# Patient Record
Sex: FEMALE | Race: BLACK OR AFRICAN AMERICAN | NOT HISPANIC OR LATINO | Employment: PART TIME | URBAN - METROPOLITAN AREA
[De-identification: names, ages, dates, MRNs, and addresses within clinical notes are randomized per-mention and may not be internally consistent; named-entity substitution may affect disease eponyms.]

---
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
## 2019-07-28 ENCOUNTER — APPOINTMENT (EMERGENCY)
Dept: RADIOLOGY | Facility: HOSPITAL | Age: 19
End: 2019-07-28
Payer: COMMERCIAL

## 2019-07-28 ENCOUNTER — HOSPITAL ENCOUNTER (EMERGENCY)
Facility: HOSPITAL | Age: 19
Discharge: HOME | End: 2019-07-28
Attending: EMERGENCY MEDICINE | Admitting: EMERGENCY MEDICINE
Payer: COMMERCIAL

## 2019-07-28 VITALS
TEMPERATURE: 98.7 F | SYSTOLIC BLOOD PRESSURE: 115 MMHG | HEART RATE: 74 BPM | OXYGEN SATURATION: 99 % | BODY MASS INDEX: 18.4 KG/M2 | WEIGHT: 114 LBS | RESPIRATION RATE: 16 BRPM | DIASTOLIC BLOOD PRESSURE: 90 MMHG

## 2019-07-28 DIAGNOSIS — S63.619A FINGER SPRAIN: Primary | ICD-10-CM

## 2019-07-28 PROCEDURE — 99283 EMERGENCY DEPT VISIT LOW MDM: CPT

## 2019-07-28 PROCEDURE — 73130 X-RAY EXAM OF HAND: CPT

## 2019-07-28 PROCEDURE — 99283 EMERGENCY DEPT VISIT LOW MDM: CPT | Performed by: EMERGENCY MEDICINE

## 2019-07-28 RX ORDER — IBUPROFEN 600 MG/1
600 TABLET ORAL EVERY 6 HOURS PRN
Qty: 30 TABLET | Refills: 0 | Status: SHIPPED | OUTPATIENT
Start: 2019-07-28 | End: 2019-12-17 | Stop reason: ALTCHOICE

## 2019-07-28 NOTE — ED PROCEDURE NOTE
Procedure  Splint application  Date/Time: 7/28/2019 12:58 PM  Performed by: Raven Alonso PA-C  Authorized by: Raven Alonso PA-C     Patient location:  ED  Procedure performed by emergency physician: No    Other Assisting Provider: No    Consent:     Consent obtained:  Verbal    Consent given by:  Patient    Risks discussed:  Discoloration, numbness, pain and swelling    Alternatives discussed:  No treatment  Universal protocol:     Procedure explained and questions answered to patient or proxy's satisfaction: yes      Relevant documents present and verified: yes      Test results available and properly labeled: yes      Radiology Images displayed and confirmed  If images not available, report reviewed: yes      Required blood products, implants, devices, and special equipment available: yes      Site/side marked: yes      Immediately prior to procedure a time out was called: yes      Patient identity confirmed:  Verbally with patient and arm band  Indication:     Indications: sprain/strain    Pre-procedure details:     Sensation:  Normal  Procedure details:     Laterality:  Right    Location:  Finger    Finger:  R small finger    Strapping: no      Splint type:  Finger splint, static    Supplies:  Aluminum splint  Post-procedure details:     Pain:  Improved    Sensation:  Normal    Neurovascular Exam: skin pink, capillary refill <2 sec, normal pulses and skin intact, warm, and dry      Patient tolerance of procedure:   Tolerated well, no immediate complications                     Raven Alonso PA-C  07/28/19 1258

## 2019-07-28 NOTE — ED PROVIDER NOTES
History  Chief Complaint   Patient presents with    Finger Injury     Hit right pinky finger  while playing basketball  No meds taken  History provided by:  Patient   used: No    Medical Problem   Location:  Pt with right hand/5th finger pain  from basketball hitting it  Severity:  Mild  Onset quality:  Sudden  Duration:  2 days  Timing:  Constant  Progression:  Unchanged  Chronicity:  New  Associated symptoms: no abdominal pain, no chest pain, no congestion, no cough, no diarrhea, no ear pain, no fatigue, no fever, no headaches, no loss of consciousness, no myalgias, no nausea, no rash, no rhinorrhea, no shortness of breath, no sore throat, no vomiting and no wheezing        None       History reviewed  No pertinent past medical history  History reviewed  No pertinent surgical history  History reviewed  No pertinent family history  I have reviewed and agree with the history as documented  Social History     Tobacco Use    Smoking status: Never Smoker    Smokeless tobacco: Never Used   Substance Use Topics    Alcohol use: No    Drug use: No        Review of Systems   Constitutional: Negative  Negative for fatigue and fever  HENT: Negative  Negative for congestion, ear pain, rhinorrhea and sore throat  Eyes: Negative  Respiratory: Negative  Negative for cough, shortness of breath and wheezing  Cardiovascular: Negative  Negative for chest pain  Gastrointestinal: Negative  Negative for abdominal pain, diarrhea, nausea and vomiting  Endocrine: Negative  Genitourinary: Negative  Musculoskeletal: Negative  Negative for myalgias  Skin: Negative  Negative for rash  Allergic/Immunologic: Negative  Neurological: Negative  Negative for loss of consciousness and headaches  Hematological: Negative  Psychiatric/Behavioral: Negative  All other systems reviewed and are negative        Physical Exam  Physical Exam   Constitutional: She appears well-developed and well-nourished  HENT:   Head: Normocephalic and atraumatic  Right Ear: External ear normal    Left Ear: External ear normal    Nose: Nose normal    Mouth/Throat: Oropharynx is clear and moist    Eyes: Pupils are equal, round, and reactive to light  Conjunctivae and EOM are normal    Neck: Normal range of motion  Neck supple  Cardiovascular: Normal rate, regular rhythm, normal heart sounds and intact distal pulses  Pulmonary/Chest: Effort normal and breath sounds normal    Abdominal: Soft  Bowel sounds are normal    Musculoskeletal: Normal range of motion  Right 5th finger mcp and pip joint pain  From with pain   sensatin wnl of tip    Neurological: She is alert  Skin: Skin is warm  Capillary refill takes less than 2 seconds  Psychiatric: She has a normal mood and affect  Her behavior is normal    Nursing note and vitals reviewed  Vital Signs  ED Triage Vitals [07/28/19 1127]   Temperature Pulse Respirations Blood Pressure SpO2   98 7 °F (37 1 °C) 75 16 112/95 99 %      Temp Source Heart Rate Source Patient Position - Orthostatic VS BP Location FiO2 (%)   Tympanic Monitor Lying Left arm --      Pain Score       8           Vitals:    07/28/19 1127 07/28/19 1313   BP: 112/95 115/90   Pulse: 75 74   Patient Position - Orthostatic VS: Lying Sitting         Visual Acuity      ED Medications  Medications - No data to display    Diagnostic Studies  Results Reviewed     None                 XR hand 3+ views RIGHT   Final Result by Marisabel Emmanuel DO (07/28 1322)      No acute osseous abnormality  Workstation performed: [id number]                    Procedures  Procedures       ED Course                               MDM    Disposition  Final diagnoses:   Finger sprain     Time reflects when diagnosis was documented in both MDM as applicable and the Disposition within this note     Time User Action Codes Description Comment    7/28/2019 12:59 PM Vaishnavi Holloway   Add [F67 025V] Finger sprain       ED Disposition     ED Disposition Condition Date/Time Comment    Discharge Stable Sun Jul 28, 2019 12:59 PM Joey Gordillo discharge to home/self care  Follow-up Information     Follow up With Specialties Details Why [address] Devora Cee MD [address] [facility] [address] [place] [address]            Discharge Medication List as of 7/28/2019 12:59 PM      START taking these medications    Details   ibuprofen (MOTRIN) 600 mg tablet Take 1 tablet (600 mg total) by mouth every 6 (six) hours as needed for mild pain, Starting Sun 7/28/2019, Print           No discharge procedures on file      ED Provider  Electronically Signed by           Adam Benavides PA-C  07/28/19 0961

## 2019-10-25 ENCOUNTER — OFFICE VISIT (OUTPATIENT)
Dept: FAMILY MEDICINE CLINIC | Facility: CLINIC | Age: 19
End: 2019-10-25

## 2019-10-25 VITALS
OXYGEN SATURATION: 98 % | RESPIRATION RATE: 18 BRPM | HEIGHT: 67 IN | WEIGHT: 115.7 LBS | HEART RATE: 88 BPM | SYSTOLIC BLOOD PRESSURE: 124 MMHG | TEMPERATURE: 97.6 F | DIASTOLIC BLOOD PRESSURE: 80 MMHG | BODY MASS INDEX: 18.16 KG/M2

## 2019-10-25 DIAGNOSIS — Z00.00 ANNUAL PHYSICAL EXAM: Primary | ICD-10-CM

## 2019-10-25 DIAGNOSIS — Z13.220 SCREENING CHOLESTEROL LEVEL: ICD-10-CM

## 2019-10-25 DIAGNOSIS — Z13.1 SCREENING FOR DIABETES MELLITUS: ICD-10-CM

## 2019-10-25 PROCEDURE — 99385 PREV VISIT NEW AGE 18-39: CPT | Performed by: PHYSICIAN ASSISTANT

## 2019-10-25 NOTE — PATIENT INSTRUCTIONS

## 2019-10-25 NOTE — PROGRESS NOTES
ADULT ANNUAL [facility] [address] PRACTICE [place]    NAME: Maria R Mackenzie  AGE: 20 y o  SEX: female  : 2000     DATE: 10/25/2019     Assessment and Plan:     Problem List Items Addressed This Visit     None      Visit Diagnoses     Annual physical exam    -  Primary    Screening for diabetes mellitus        Relevant Orders    Comprehensive metabolic panel    Screening cholesterol level        Relevant Orders    Lipid panel          Immunizations and preventive care screenings were discussed with patient today  Appropriate education was printed on patient's after visit summary  Counseling:  Alcohol/drug use: discussed moderation in alcohol intake, the recommendations for healthy alcohol use, and avoidance of illicit drug use  Dental Health: discussed importance of regular tooth brushing, flossing, and dental visits  Injury prevention: discussed safety/seat belts, safety helmets, smoke detectors, carbon dioxide detectors, and smoking near bedding or upholstery  Sexual health: discussed sexually transmitted diseases, partner selection, use of condoms, avoidance of unintended pregnancy, and contraceptive alternatives  · Exercise: the importance of regular exercise/physical activity was discussed  Recommend exercise 3-5 times per week for at least 30 minutes  No follow-ups on file  Chief Complaint:     Chief Complaint   Patient presents with   [address]     no concerns at this time, has drivers form to be filled out       History of Present Illness:     Adult Annual Physical   Patient here for a comprehensive physical exam  The patient reports no problems  Diet and Physical Activity  · Diet/Nutrition: well balanced diet, limited junk food and consuming 3-5 servings of fruits/vegetables daily  · Exercise: no formal exercise        Depression Screening  PHQ-9 Depression Screening    PHQ-9:    Frequency of the following problems over the past two weeks:       Little interest or pleasure in doing things:  0 - not at all  Feeling down, depressed, or hopeless:  0 - not at all  PHQ-2 Score:  0       General Health  · Sleep: sleeps well and gets 7-8 hours of sleep on average  · Hearing: normal - bilateral   · Vision: no vision problems  · Dental: regular dental visits and brushes teeth twice daily  /GYN Health  · Last menstrual period: 9/30/2019  · Contraceptive method: barrier methods  · History of STDs?: no      Review of Systems:     Review of Systems   Constitutional: Negative for activity change, appetite change, chills, diaphoresis, fatigue and fever  HENT: Negative for congestion, ear pain, hearing loss, rhinorrhea and sore throat  Eyes: Negative for pain and visual disturbance  Respiratory: Negative for cough, shortness of breath and wheezing  Cardiovascular: Negative for chest pain, palpitations and leg swelling  Gastrointestinal: Negative for abdominal pain, blood in stool, constipation, diarrhea, nausea and vomiting  Endocrine: Negative for cold intolerance, heat intolerance and polyuria  Genitourinary: Negative for difficulty urinating, dysuria, frequency, hematuria and urgency  Musculoskeletal: Negative for arthralgias, joint swelling and myalgias  Skin: Negative for rash and wound  Neurological: Negative for dizziness, seizures, syncope, weakness, numbness and headaches  Psychiatric/Behavioral: Negative for dysphoric mood and sleep disturbance  The patient is not nervous/anxious  Past Medical History:     History reviewed  No pertinent past medical history  Past Surgical History:     History reviewed  No pertinent surgical history     Social History:     Social History     Socioeconomic History    Marital status: Single     Spouse name: None    Number of children: None    Years of education: None    Highest education level: None   Occupational History    None   Social Needs    Financial resource strain: None    Food insecurity:     Worry: None     Inability: None    Transportation needs:     Medical: None     Non-medical: None   Tobacco Use    Smoking status: Never Smoker    Smokeless tobacco: Never Used   Substance and Sexual Activity    Alcohol use: No    Drug use: No    Sexual activity: None   Lifestyle    Physical activity:     Days per week: None     Minutes per session: None    Stress: None   Relationships    Social connections:     Talks on phone: None     Gets together: None     Attends Orthodoxy service: None     Active member of club or organization: None     Attends meetings of clubs or organizations: None     Relationship status: None    Intimate partner violence:     Fear of current or ex partner: None     Emotionally abused: None     Physically abused: None     Forced sexual activity: None   Other Topics Concern    None   Social History Narrative    None      Family History:     History reviewed  No pertinent family history  Current Medications:     Current Outpatient Medications   Medication Sig Dispense Refill    ibuprofen (MOTRIN) 600 mg tablet Take 1 tablet (600 mg total) by mouth every 6 (six) hours as needed for mild pain 30 tablet 0     No current facility-administered medications for this visit  Allergies: Allergies   Allergen Reactions    Nickel       Physical Exam:     /80 (BP Location: Left arm, Patient Position: Sitting, Cuff Size: Standard)   Pulse 88   Temp 97 6 °F (36 4 °C) (Temporal)   Resp 18   Ht 5' 7" (1 702 m)   Wt 52 5 kg (115 lb 11 2 oz)   LMP 09/30/2019 (Approximate)   SpO2 98%   Breastfeeding? Yes   BMI 18 12 kg/m²     Physical Exam   Constitutional: She is oriented to person, place, and time  She appears well-developed and well-nourished  No distress     HENT:   Right Ear: Hearing, tympanic membrane, external ear and ear canal normal    Left Ear: Hearing, tympanic membrane, external ear and ear canal normal    Nose: Nose normal    Mouth/Throat: Oropharynx is clear and moist and mucous membranes are normal    Eyes: Pupils are equal, round, and reactive to light  Conjunctivae, EOM and lids are normal    Neck: Normal range of motion  Neck supple  Cardiovascular: Normal rate, regular rhythm, normal heart sounds and normal pulses  Exam reveals no gallop and no friction rub  No murmur heard  Pulmonary/Chest: Effort normal and breath sounds normal  No respiratory distress  She has no wheezes  She has no rales  Abdominal: Soft  Normal appearance and bowel sounds are normal  She exhibits no distension  There is no tenderness  There is no rebound and no guarding  Musculoskeletal: Normal range of motion  She exhibits no edema  Lymphadenopathy:     She has no cervical adenopathy  Neurological: She is alert and oriented to person, place, and time  She has normal reflexes  She displays normal reflexes  No cranial nerve deficit  She exhibits normal muscle tone  Coordination normal    Skin: Skin is warm and dry  No rash noted  She is not diaphoretic  Psychiatric: She has a normal mood and affect  Her behavior is normal  Thought content normal    Nursing note and vitals reviewed        ELLIS [address]

## 2019-12-17 ENCOUNTER — HOSPITAL ENCOUNTER (EMERGENCY)
Facility: HOSPITAL | Age: 19
Discharge: HOME | End: 2019-12-17
Admitting: EMERGENCY MEDICINE
Payer: COMMERCIAL

## 2019-12-17 ENCOUNTER — APPOINTMENT (EMERGENCY)
Dept: RADIOLOGY | Facility: HOSPITAL | Age: 19
End: 2019-12-17
Payer: COMMERCIAL

## 2019-12-17 VITALS
OXYGEN SATURATION: 100 % | SYSTOLIC BLOOD PRESSURE: 136 MMHG | HEART RATE: 82 BPM | DIASTOLIC BLOOD PRESSURE: 80 MMHG | TEMPERATURE: 98.2 F | RESPIRATION RATE: 18 BRPM | WEIGHT: 111.77 LBS | BODY MASS INDEX: 17.51 KG/M2

## 2019-12-17 DIAGNOSIS — S90.32XA CONTUSION OF LEFT FOOT, INITIAL ENCOUNTER: Primary | ICD-10-CM

## 2019-12-17 PROCEDURE — 99283 EMERGENCY DEPT VISIT LOW MDM: CPT | Performed by: EMERGENCY MEDICINE

## 2019-12-17 PROCEDURE — 73630 X-RAY EXAM OF FOOT: CPT

## 2019-12-17 PROCEDURE — 99283 EMERGENCY DEPT VISIT LOW MDM: CPT

## 2019-12-17 RX ORDER — IBUPROFEN 400 MG/1
400 TABLET ORAL ONCE
Status: COMPLETED | OUTPATIENT
Start: 2019-12-17 | End: 2019-12-17

## 2019-12-17 RX ADMIN — IBUPROFEN 400 MG: 400 TABLET ORAL at 09:30

## 2019-12-17 NOTE — ED PROVIDER NOTES
History  Chief Complaint   Patient presents with    Foot Injury     Reports dropping a mirror on to right foot, states injury happened last night  Denies taking any pain medication  20 y o  Female presents with left foot pain after dropping mirror onto top of right foot  + pain worse with weight bearing, ROM and ambulation          None       History reviewed  No pertinent past medical history  History reviewed  No pertinent surgical history  History reviewed  No pertinent family history  I have reviewed and agree with the history as documented  Social History     Tobacco Use    Smoking status: Never Smoker    Smokeless tobacco: Never Used   Substance Use Topics    Alcohol use: No    Drug use: No        Review of Systems    Physical Exam  Physical Exam   Constitutional: She is oriented to person, place, and time  She appears well-developed and well-nourished  HENT:   Head: Normocephalic and atraumatic  Right Ear: External ear normal    Left Ear: External ear normal    Nose: Nose normal    Eyes: Conjunctivae and EOM are normal    Neck: Normal range of motion  Cardiovascular: Intact distal pulses  Pulmonary/Chest: Effort normal    Musculoskeletal: She exhibits tenderness  Left ankle: She exhibits decreased range of motion  Tenderness  Feet:    Neurological: She is alert and oriented to person, place, and time  Skin: Skin is warm and dry  Capillary refill takes less than 2 seconds  Psychiatric: She has a normal mood and affect  Her behavior is normal  Judgment and thought content normal    Nursing note and vitals reviewed        Vital Signs  ED Triage Vitals [12/17/19 0908]   Temperature Pulse Respirations Blood Pressure SpO2   98 2 °F (36 8 °C) 82 18 136/80 100 %      Temp Source Heart Rate Source Patient Position - Orthostatic VS BP Location FiO2 (%)   Oral Monitor Sitting Right arm --      Pain Score       8           Vitals:    12/17/19 0908   BP: 136/80   Pulse: 82 Patient Position - Orthostatic VS: Sitting         Visual Acuity      ED Medications  Medications   ibuprofen (MOTRIN) tablet 400 mg (400 mg Oral Given 12/17/19 0930)       Diagnostic Studies  Results Reviewed     None                 XR foot 3+ views LEFT   Final Result by Maximo May MD (12/17 1010)      No acute osseous abnormality  Workstation performed: [id number]                    Procedures  Procedures         ED Course                               MDM  Number of Diagnoses or Management Options  Contusion of left foot, initial encounter: minor     Amount and/or Complexity of Data Reviewed  Tests in the radiology section of CPT®: ordered and reviewed          Disposition  Final diagnoses:   Contusion of left foot, initial encounter     Time reflects when diagnosis was documented in both MDM as applicable and the Disposition within this note     Time User Action Codes Description Comment    12/17/2019  9:26 AM Giorgio Vergara Add [[id number] [id number]] Contusion of left foot, initial encounter       ED Disposition     ED Disposition Condition Date/Time Comment    Discharge Stable Tue Dec 17, 2019  9:26 AM Esaw Bolds discharge to home/self care  Follow-up Information     Follow up With Specialties Details Why Contact Info Additional Information    Nhi Mejía MD [facility]   [address]  [address] [facility] [phone]       [address] [facility] [address] [facility] [address] [facility] [address]  [address] , [address], [place] [phone], [facility], [place], [address]          There are no discharge medications for this patient  No discharge procedures on file      ED Provider  Electronically Signed by           Kristy Iglesias PA-C  12/20/19 0319

## 2020-02-04 ENCOUNTER — APPOINTMENT (EMERGENCY)
Dept: RADIOLOGY | Facility: HOSPITAL | Age: 20
End: 2020-02-04
Payer: COMMERCIAL

## 2020-02-04 ENCOUNTER — HOSPITAL ENCOUNTER (EMERGENCY)
Facility: HOSPITAL | Age: 20
Discharge: HOME | End: 2020-02-04
Attending: EMERGENCY MEDICINE | Admitting: EMERGENCY MEDICINE
Payer: COMMERCIAL

## 2020-02-04 VITALS
HEIGHT: 67 IN | SYSTOLIC BLOOD PRESSURE: 131 MMHG | OXYGEN SATURATION: 99 % | HEART RATE: 72 BPM | DIASTOLIC BLOOD PRESSURE: 84 MMHG | BODY MASS INDEX: 17.3 KG/M2 | RESPIRATION RATE: 14 BRPM | TEMPERATURE: 97.6 F | WEIGHT: 110.23 LBS

## 2020-02-04 DIAGNOSIS — S63.619A FINGER SPRAIN: Primary | ICD-10-CM

## 2020-02-04 PROCEDURE — 99282 EMERGENCY DEPT VISIT SF MDM: CPT | Performed by: PHYSICIAN ASSISTANT

## 2020-02-04 PROCEDURE — 73140 X-RAY EXAM OF FINGER(S): CPT

## 2020-02-04 PROCEDURE — 99283 EMERGENCY DEPT VISIT LOW MDM: CPT

## 2020-05-27 ENCOUNTER — HOSPITAL ENCOUNTER (EMERGENCY)
Facility: HOSPITAL | Age: 20
Discharge: HOME | End: 2020-05-27
Attending: EMERGENCY MEDICINE | Admitting: EMERGENCY MEDICINE
Payer: COMMERCIAL

## 2020-05-27 ENCOUNTER — APPOINTMENT (EMERGENCY)
Dept: RADIOLOGY | Facility: HOSPITAL | Age: 20
End: 2020-05-27
Payer: COMMERCIAL

## 2020-05-27 VITALS
HEART RATE: 90 BPM | RESPIRATION RATE: 16 BRPM | SYSTOLIC BLOOD PRESSURE: 113 MMHG | DIASTOLIC BLOOD PRESSURE: 59 MMHG | TEMPERATURE: 97.8 F | BODY MASS INDEX: 17.92 KG/M2 | WEIGHT: 114.42 LBS | OXYGEN SATURATION: 98 %

## 2020-05-27 DIAGNOSIS — S69.91XA INJURY OF RIGHT INDEX FINGER, INITIAL ENCOUNTER: Primary | ICD-10-CM

## 2020-05-27 PROCEDURE — 99283 EMERGENCY DEPT VISIT LOW MDM: CPT

## 2020-05-27 PROCEDURE — 73140 X-RAY EXAM OF FINGER(S): CPT

## 2020-05-27 PROCEDURE — 99284 EMERGENCY DEPT VISIT MOD MDM: CPT | Performed by: PHYSICIAN ASSISTANT

## 2020-05-27 RX ORDER — NAPROXEN 500 MG/1
500 TABLET ORAL 2 TIMES DAILY WITH MEALS
Qty: 30 TABLET | Refills: 0 | Status: SHIPPED | OUTPATIENT
Start: 2020-05-27

## 2020-08-17 ENCOUNTER — TELEPHONE (OUTPATIENT)
Dept: FAMILY MEDICINE CLINIC | Facility: CLINIC | Age: 20
End: 2020-08-17

## 2020-08-17 ENCOUNTER — OFFICE VISIT (OUTPATIENT)
Dept: OBGYN CLINIC | Facility: MEDICAL CENTER | Age: 20
End: 2020-08-17
Payer: COMMERCIAL

## 2020-08-17 VITALS
TEMPERATURE: 98.2 F | HEART RATE: 80 BPM | HEIGHT: 66 IN | WEIGHT: 114 LBS | SYSTOLIC BLOOD PRESSURE: 120 MMHG | DIASTOLIC BLOOD PRESSURE: 79 MMHG | BODY MASS INDEX: 18.32 KG/M2

## 2020-08-17 DIAGNOSIS — M24.541 CONTRACTURE OF FINGER JOINT, RIGHT: ICD-10-CM

## 2020-08-17 DIAGNOSIS — M20.031 SWAN-NECK DEFORMITY OF FINGER OF RIGHT HAND: Primary | ICD-10-CM

## 2020-08-17 PROCEDURE — 3008F BODY MASS INDEX DOCD: CPT | Performed by: ORTHOPAEDIC SURGERY

## 2020-08-17 PROCEDURE — 99204 OFFICE O/P NEW MOD 45 MIN: CPT | Performed by: ORTHOPAEDIC SURGERY

## 2020-08-17 PROCEDURE — 1036F TOBACCO NON-USER: CPT | Performed by: ORTHOPAEDIC SURGERY

## 2020-08-17 NOTE — LETTER
August 17, 2020     Patient: Diego Lee   YOB: 2000   Date of Visit: 8/17/2020       To Whom it May Concern:    Diego Lee is under my professional care  She was seen in my office on 8/17/2020  She may return to work with no restrictions  If you have any questions or concerns, please don't hesitate to call           Sincerely,          Jean-Pierre Joya MD        CC: Diego Lee

## 2020-08-17 NOTE — PROGRESS NOTES
Chief Complaint     Right index finger pain and stiffness      History of Present Illness     Alina Cervantes is a 23 y o  female right-hand dominant, presents with right index finger pain and stiffness  Patient states that on 02/03/2020, she sustained an injury to the right index finger while playing basketball  She thinks that the basketball struck her axially along the tip of the finger  She reported to the ED the following day where she was placed into a splint  Patient was not instructed to follow up with anyone and reported to the ED once again on 05/27/2020 for continued finger pain  Immobilization was continued and she was instructed to follow up with orthopedic surgeon  Patient presents today with splint which she has been wearing essentially since her initial injury  States that she was unable to flex the finger very much and has significant discomfort with attempted flexion  Patient works as a  this and finds that the finger pain and stiffness is somewhat limiting  Does endorse diminished sensation to the volar aspect of the index finger  Denies any new traumatic injury to the digit  Denies any catching, locking, clicking or catching  History reviewed  No pertinent past medical history  History reviewed  No pertinent surgical history  Allergies   Allergen Reactions    Nickel        Current Outpatient Medications on File Prior to Visit   Medication Sig Dispense Refill    naproxen (NAPROSYN) 500 mg tablet Take 1 tablet (500 mg total) by mouth 2 (two) times a day with meals 30 tablet 0     No current facility-administered medications on file prior to visit  Social History     Tobacco Use    Smoking status: Never Smoker    Smokeless tobacco: Never Used   Substance Use Topics    Alcohol use: No    Drug use: No       History reviewed  No pertinent family history  Review of Systems     As stated in the HPI   All other systems were reviewed and are negative  Physical Exam     /79   Pulse 80   Temp 98 2 °F (36 8 °C)   Ht 5' 6" (1 676 m)   Wt 51 7 kg (114 lb)   BMI 18 40 kg/m²     GENERAL: This is a well-developed, well-nourished, age-appropriate patient in no acute distress  The patient is alert and oriented x3  Pleasant and cooperative  Eyes: Anicteric sclerae  Extraocular movements appear intact  HENT: Nares are patent with no drainage  Lungs: There is equal chest rise on inspection  Breathing is non-labored with no audible wheezing  Cardiovascular: No cyanosis  No upper extremity lymphadema  Skin: Skin is warm to touch  No obvious skin lesions or rashes other than described below  Neurologic: No ataxia  Psychiatric: Mood and affect are appropriate  Right hand exam:  · Skin intact  · Extensor lag of DIP, passively correctable to neutral  · DIP arc of motion roughly 20°  · [place] neck deformity to PIP  · DPC 7  · PIP and DIP stable to varus/valgus stress  · 5/5 motor FDI, APB, FDS, FPL2&5  · SILT m/r/u  Some diminished sensation to the pad of the index finger  · Brisk capillary refill    Data Review     Results Reviewed     None             Imaging:  Previous radiographs of the right hand were personally reviewed by myself  They reveal no acute fractures or dislocations    Assessment and Plan      Diagnoses and all orders for this visit:    [place]-neck deformity of finger of right hand  -     Ambulatory referral to Physical Therapy; Future    Contracture of finger joint, right         76-year-old female with 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     Thorough discussion was had with the patient regarding the nature of her injury  It is explained that due to prolonged immobilization of the digit, as she developed a significant contracture of the joint  This also explained that the swan-neck deformity is the result of a chronic mallet finger    The primary goal over the next several months is for the patient to restore her range of motion to the right index finger PIP  Is explained the physical and taken intensive course of physical therapy both in the formal physical therapy setting as well as at home  Over the course of several months we like to see steady improvement of the PIP joint contracture  He she understands that if the joint contracture does not improve to her satisfaction, she could require possible surgical intervention for contracture release  We like to see her back in 2 months for repeat evaluation        Follow Up:  2 months    To Do Next Visit:  Soft repeat examinations    PROCEDURES PERFORMED:  Procedures  No Procedures performed today

## 2020-08-24 ENCOUNTER — EVALUATION (OUTPATIENT)
Dept: PHYSICAL THERAPY | Facility: MEDICAL CENTER | Age: 20
End: 2020-08-24
Payer: COMMERCIAL

## 2020-08-24 DIAGNOSIS — M20.031 SWAN-NECK DEFORMITY OF FINGER OF RIGHT HAND: Primary | ICD-10-CM

## 2020-08-24 PROCEDURE — 97140 MANUAL THERAPY 1/> REGIONS: CPT

## 2020-08-24 PROCEDURE — 97161 PT EVAL LOW COMPLEX 20 MIN: CPT

## 2020-08-24 NOTE — PROGRESS NOTES
PT Evaluation     Today's date: 2020  Patient name: Sharee Rosenberg  : 2000  MRN: [id number]  Referring provider: Alyssia Barros MD  Dx:   Encounter Diagnosis     ICD-10-CM    1  [place]-neck deformity of finger of right hand  M20 031 Ambulatory referral to Physical Therapy                  Assessment  Assessment details: Pt is a 23year old RHD female who presents to PT with a swan neck deformity to her R IF  Pt presents with a severe extension contracture to her IF PIP joint with moderate pain with any passive flexion  Fabricated custom forearm based static progressive splint, MCP blocking with a loop pulling distal IF into PIP flexion  Also fabricated custom relative motion splint keeping IF and RF in relative extension compared to the   Pt able to don/doff both independently  She was instructed on wearing schedules for both and understood proper application  Pt should benefit from skilled PT to help reduce pain, improve ROM, better mechanics of her IF, improve strength, and better her overall functioning   Thank you kindly for your referral    Impairments: abnormal or restricted ROM, activity intolerance, lacks appropriate home exercise program and pain with function  Barriers to therapy: Works Sun-Wed  Understanding of Dx/Px/POC: good   Prognosis: good    Goals  STG (2-3 weeks)  1: Reduce pain 2 grades on VAS  2: Pt independent with stretching program/ static progressive splint    LTG (4-6 weeks)  1: Improve FOTO 10-15 pts  2: Improve IF PIP joint flexion 15-20 degrees  3: Pt able to tolerate relative motion splint with daily functioning  4: Pt able to  and lift objects without limitation in her IF    Plan  Plan details: Initiate PT as per POC  Patient would benefit from: skilled physical therapy  Planned modality interventions: cryotherapy, thermotherapy: hydrocollator packs and ultrasound  Planned therapy interventions: manual therapy, joint mobilization, massage, orthotic management and training, patient education, strengthening, stretching, therapeutic activities, therapeutic exercise, flexibility, functional ROM exercises, fine motor coordination training and home exercise program  Frequency: 1x week (work schedule)  Duration in visits: 8  Duration in weeks: 8  Plan of Care beginning date: 2020  Plan of Care expiration date: 10/22/2020  Treatment plan discussed with: patient        Subjective Evaluation    History of Present Illness  Date of onset: 2/3/2020  Mechanism of injury: trauma  Mechanism of injury: Was playing basketball back in Feb- went to ER-was placed in splint  Unable to bend PIP joint of IF  Had another injury to one of the other fingers- playing basketball  Has been trying to bend the finger herself but its been too painful          Not a recurrent problem   Quality of life: good    Pain  Current pain ratin  At best pain ratin  At worst pain ratin  Quality: throbbing and tight  Relieving factors: rest and support  Progression: no change    Social Support    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)  Hand dominance: right    Patient Goals  Patient goals for therapy: independence with ADLs/IADLs, increased strength, return to work, decreased pain and increased motion          Objective     Tenderness     Additional Tenderness Details  Tenderness to dorsal PIP joint    Active Range of Motion     Right Digits   Flexion   Index     MCP: 65    PIP: 5    DIP: 10  Extension   Index     DIP: 10    Passive Range of Motion     Right Digits   Flexion   Index     PIP: 25    Additional Passive Range of Motion Details  Pain at end range      Flowsheet Rows      Most Recent Value   PT/OT G-Codes   Current Score  48   Projected Score  62             Precautions: DOI 2/3/2020      Manuals             STM             PROM IF             [facility]                          Neuro Re-Ed Ther Ex             Towel bunches             Pegs grasping             Nuts/bolts             Grooved pegs             clothespins             fingerweb digit flexion             flexbar towel twist                          Ther Activity                                       Gait Training                                       Modalities                          US dorsal PIP into fleixon             CP PRN

## 2020-09-03 ENCOUNTER — OFFICE VISIT (OUTPATIENT)
Dept: PHYSICAL THERAPY | Facility: MEDICAL CENTER | Age: 20
End: 2020-09-03
Payer: COMMERCIAL

## 2020-09-03 DIAGNOSIS — M20.031 SWAN-NECK DEFORMITY OF FINGER OF RIGHT HAND: Primary | ICD-10-CM

## 2020-09-03 PROCEDURE — 97110 THERAPEUTIC EXERCISES: CPT

## 2020-09-03 PROCEDURE — 97140 MANUAL THERAPY 1/> REGIONS: CPT

## 2020-09-03 PROCEDURE — 97010 HOT OR COLD PACKS THERAPY: CPT

## 2020-09-03 NOTE — PROGRESS NOTES
Daily Note     Today's date: 9/3/2020  Patient name: Mustapha Farr  : 2000  MRN: [id number]  Referring provider: Jessica Weeks MD  Dx:   Encounter Diagnosis     ICD-10-CM    1  [place]-neck deformity of finger of right hand  M20 031         21: pt continued with OT  D/C from PT at this time          Subjective: Pt reports she has been wearing static progressive splint several times a day but not much change in finger motion yet  Objective: See treatment diary below      Assessment: Tolerated treatment well  Patient exhibited good technique with therapeutic exercises and would benefit from continued PT PROM prior to manuals 40 degrees ( + 15 degrees)  PROM PIP flexion- 60-65 (35-40)  AROM comp flexion 40 degrees (35)  Pt has significant improvement in her ROM in her PIP joint since IE  Pt reported mild soreness post session but declined CP application  Plan: Continue per plan of care        Precautions: DOI 2/3/2020      Manuals 9/3            STM             PROM IF 10            PIP joint mob into fleixon 2            blocking 3             15            Neuro Re-Ed                                                                                                        Ther Ex             Towel bunches 2 min            Pegs grasping 3 min            Nuts/bolts 3 min            Grooved pegs 1 board            clothespins NV            fingerweb digit flexion Yellow 20x            flexbar towel twist NV             15            Ther Activity                                       Gait Training                                       Modalities             MH 10            US dorsal PIP into fleixon             CP PRN

## 2020-09-24 ENCOUNTER — EVALUATION (OUTPATIENT)
Dept: OCCUPATIONAL THERAPY | Facility: MEDICAL CENTER | Age: 20
End: 2020-09-24

## 2020-09-24 DIAGNOSIS — M20.031 SWAN-NECK DEFORMITY OF FINGER OF RIGHT HAND: Primary | ICD-10-CM

## 2020-09-24 PROCEDURE — 97140 MANUAL THERAPY 1/> REGIONS: CPT

## 2020-09-24 PROCEDURE — 97110 THERAPEUTIC EXERCISES: CPT

## 2020-09-24 PROCEDURE — 97168 OT RE-EVAL EST PLAN CARE: CPT

## 2020-09-24 NOTE — PROGRESS NOTES
OT Re-Evaluation     Today's date: 2020  Patient name: Michelle Valerio  : 2000  MRN: [id number]  Referring provider: Daina Aparicio MD  Dx:   Encounter Diagnosis     ICD-10-CM    1  [place]-neck deformity of finger of right hand  M20 031                   Assessment  Assessment details: Pt is a 23year old RHD female who presents to PT with a swan neck deformity to her R IF  Pt presents with a severe extension contracture to her IF PIP joint with moderate pain with any passive flexion  Fabricated custom forearm based static progressive splint, MCP blocking with a loop pulling distal IF into PIP flexion  Also fabricated custom relative motion splint keeping IF and RF in relative extension compared to the   Pt able to don/doff both independently  She was instructed on wearing schedules for both and understood proper application  Pt should benefit from skilled PT to help reduce pain, improve ROM, better mechanics of her IF, improve strength, and better her overall functioning  Thank you kindly for your referral      20: Pt seen for re-evaluation today secondary to change in service provider and to establish a new POC  Pt presents to OT today with decreased pain and improved AROM in the R IF  She is independent and compliant with use of her relative motion and static progressive splints  Pt is making steady progress & would benefit from continued skilled OT to further improve AROM, strength and functional use of her dominant R hand     Impairments: abnormal or restricted ROM, activity intolerance, lacks appropriate home exercise program and pain with function  Barriers to therapy: Works Sun-Wed  Understanding of Dx/Px/POC: good   Prognosis: good    Goals  STG (2-3 weeks)  1: Reduce pain 2 grades on VAS  2: Pt independent with stretching program/ static progressive splint    LTG (4-6 weeks)  1: Improve FOTO 10-15 pts  2: Improve IF PIP joint flexion 15-20 degrees  3: Pt able to tolerate relative motion splint with daily functioning  4: Pt able to  and lift objects without limitation in her IF    Plan  Plan details: Initiate OT as per POC  Patient would benefit from: custom splinting and skilled occupational therapy  Planned modality interventions: cryotherapy, thermotherapy: hydrocollator packs and ultrasound  Planned therapy interventions: manual therapy, joint mobilization, massage, orthotic management and training, patient education, strengthening, stretching, therapeutic activities, therapeutic exercise, flexibility, functional ROM exercises, fine motor coordination training and home exercise program  Frequency: 1x week (work schedule)  Duration in visits: 8  Duration in weeks: 8  Plan of Care beginning date: 2020  Plan of Care expiration date: 2020  Treatment plan discussed with: patient        Subjective Evaluation    History of Present Illness  Date of onset: 2/3/2020  Mechanism of injury: trauma  Mechanism of injury: Was playing basketball back in Feb- went to ER-was placed in splint  Unable to bend PIP joint of IF  Had another injury to one of the other fingers- playing basketball  Has been trying to bend the finger herself but its been too painful          Not a recurrent problem   Quality of life: good    Pain  Current pain ratin  At best pain ratin  At worst pain ratin  Quality: throbbing and tight  Relieving factors: rest and support  Progression: no change    Social Support    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)  Hand dominance: right    Patient Goals  Patient goals for therapy: independence with ADLs/IADLs, increased strength, return to work, decreased pain and increased motion          Objective     Tenderness     Additional Tenderness Details  Tenderness to dorsal PIP joint    Active Range of Motion     Right Digits   Flexion   Index     MCP: 70    PIP: 40    DIP: 34  Extension   Index     DIP: 10    Passive Range of Motion     Right Digits   Flexion   Index     PIP: 52    Additional Passive Range of Motion Details  Pain at end range      Flowsheet Rows      Most Recent Value   PT/OT G-Codes   Current Score  59   Projected Score  73               Precautions: DOI 2/3/2020      Manuals 9/3 9/24           STM  5m           PROM IF 10 10m           PIP joint mob into fleixon 2 3m           blocking 3             15 18           Neuro Re-Ed                                                                                                        Ther Ex             Towel bunches 2 min 2m           Pegs grasping 3 min 3m           Nuts/bolts 3 min            Grooved pegs 1 board translation 3m           clothespins NV            fingerweb digit flexion Yellow 20x            flexbar towel twist NV 3x10           blocking  3m            15 15           Ther Activity                                       Gait Training                                       Modalities             MH 10 10           US dorsal PIP into fleixon             CP PRN

## 2020-10-01 ENCOUNTER — OFFICE VISIT (OUTPATIENT)
Dept: OCCUPATIONAL THERAPY | Facility: MEDICAL CENTER | Age: 20
End: 2020-10-01
Payer: COMMERCIAL

## 2020-10-01 DIAGNOSIS — M20.031 SWAN-NECK DEFORMITY OF FINGER OF RIGHT HAND: Primary | ICD-10-CM

## 2020-10-01 PROCEDURE — 97110 THERAPEUTIC EXERCISES: CPT

## 2020-10-01 PROCEDURE — 97140 MANUAL THERAPY 1/> REGIONS: CPT

## 2020-10-08 ENCOUNTER — OFFICE VISIT (OUTPATIENT)
Dept: OCCUPATIONAL THERAPY | Facility: MEDICAL CENTER | Age: 20
End: 2020-10-08
Payer: COMMERCIAL

## 2020-10-08 DIAGNOSIS — M20.031 SWAN-NECK DEFORMITY OF FINGER OF RIGHT HAND: Primary | ICD-10-CM

## 2020-10-08 PROCEDURE — 97140 MANUAL THERAPY 1/> REGIONS: CPT

## 2020-10-08 PROCEDURE — 97110 THERAPEUTIC EXERCISES: CPT

## 2021-10-06 ENCOUNTER — HOSPITAL ENCOUNTER (EMERGENCY)
Facility: HOSPITAL | Age: 21
Discharge: HOME | End: 2021-10-06
Attending: EMERGENCY MEDICINE | Admitting: EMERGENCY MEDICINE
Payer: COMMERCIAL

## 2021-10-06 VITALS
OXYGEN SATURATION: 100 % | WEIGHT: 116.62 LBS | DIASTOLIC BLOOD PRESSURE: 83 MMHG | BODY MASS INDEX: 19.43 KG/M2 | HEIGHT: 65 IN | SYSTOLIC BLOOD PRESSURE: 141 MMHG | RESPIRATION RATE: 18 BRPM | TEMPERATURE: 97.5 F | HEART RATE: 72 BPM

## 2021-10-06 DIAGNOSIS — M79.10 MYALGIA: ICD-10-CM

## 2021-10-06 DIAGNOSIS — Z20.822 PERSON UNDER INVESTIGATION FOR COVID-19: Primary | ICD-10-CM

## 2021-10-06 LAB — SARS-COV-2 RNA RESP QL NAA+PROBE: NEGATIVE

## 2021-10-06 PROCEDURE — U0003 INFECTIOUS AGENT DETECTION BY NUCLEIC ACID (DNA OR RNA); SEVERE ACUTE RESPIRATORY SYNDROME CORONAVIRUS 2 (SARS-COV-2) (CORONAVIRUS DISEASE [COVID-19]), AMPLIFIED PROBE TECHNIQUE, MAKING USE OF HIGH THROUGHPUT TECHNOLOGIES AS DESCRIBED BY CMS-2020-01-R: HCPCS | Performed by: EMERGENCY MEDICINE

## 2021-10-06 PROCEDURE — 99284 EMERGENCY DEPT VISIT MOD MDM: CPT | Performed by: EMERGENCY MEDICINE

## 2021-10-06 PROCEDURE — 99282 EMERGENCY DEPT VISIT SF MDM: CPT

## 2021-10-06 PROCEDURE — U0005 INFEC AGEN DETEC AMPLI PROBE: HCPCS | Performed by: EMERGENCY MEDICINE

## 2022-01-05 ENCOUNTER — HOSPITAL ENCOUNTER (EMERGENCY)
Facility: HOSPITAL | Age: 22
Discharge: HOME | End: 2022-01-05
Attending: EMERGENCY MEDICINE | Admitting: EMERGENCY MEDICINE
Payer: COMMERCIAL

## 2022-01-05 VITALS
BODY MASS INDEX: 19.26 KG/M2 | DIASTOLIC BLOOD PRESSURE: 68 MMHG | WEIGHT: 115.74 LBS | TEMPERATURE: 98.9 F | HEART RATE: 81 BPM | SYSTOLIC BLOOD PRESSURE: 117 MMHG | RESPIRATION RATE: 19 BRPM | OXYGEN SATURATION: 100 %

## 2022-01-05 DIAGNOSIS — B34.9 ACUTE VIRAL SYNDROME: Primary | ICD-10-CM

## 2022-01-05 DIAGNOSIS — Z20.822 PERSON UNDER INVESTIGATION FOR COVID-19: ICD-10-CM

## 2022-01-05 LAB — S PYO DNA THROAT QL NAA+PROBE: NORMAL

## 2022-01-05 PROCEDURE — 87651 STREP A DNA AMP PROBE: CPT | Performed by: PHYSICIAN ASSISTANT

## 2022-01-05 PROCEDURE — 99284 EMERGENCY DEPT VISIT MOD MDM: CPT | Performed by: PHYSICIAN ASSISTANT

## 2022-01-05 PROCEDURE — 99283 EMERGENCY DEPT VISIT LOW MDM: CPT

## 2022-01-05 PROCEDURE — 87636 SARSCOV2 & INF A&B AMP PRB: CPT | Performed by: PHYSICIAN ASSISTANT

## 2022-01-05 NOTE — Clinical Note
Andrew Claire was seen and treated in our emergency department on 1/5/2022  Diagnosis:     Divyao Sudhakarbogdan    She may return on this date:     PENDING COVID19 TEST RESULT  MUST SELF-QUARANTINE AT HOME UNTIL NEGATIVE RESULT IS BACK  MAY NOT RETURN TO WORK UNTIL A NEGATIVE RESULT IS BACK  IF POSITIVE COVID19 TEST RESULT, MUST SELF QUARANTINE AT HOME FOR 10 DAYS FROM ONSET OF SYMPTOMS  MAY RETURN TO WORK/SCHOOL WHEN 3 DAYS WITHOUT SYMPTOMS, IT HAS BEEN AT LEAST 10 DAYS SINCE SYMPTOMS BEGAN AND WITHOUT FEVER FOR 24 HOURS  IF NEGATIVE COVID19 TEST RESULT, MAY RETURN TO WORK/SCHOOL WHEN PATIENT IS 3 DAYS WITHOUT SYMPTOMS  If you have any questions or concerns, please don't hesitate to call        Kostas Ortiz PA-C    ______________________________           _______________          _______________  Hospital Representative                              Date                                Time

## 2022-01-05 NOTE — DISCHARGE INSTRUCTIONS
DISCHARGE INSTRUCTIONS:    FOLLOW UP WITH YOUR PRIMARY CARE PROVIDER OR THE [address] [place] [address]  MAKE AN APPOINTMENT TO BE SEEN  TAKE TYLENOL OR MOTRIN FOR PAIN OR FEVER  REST AND DRINK PLENTY OF FLUIDS  IF SYMPTOMS WORSEN OR NEW SYMPTOMS ARISE, RETURN TO THE ER TO BE SEEN

## 2022-01-05 NOTE — ED PROVIDER NOTES
History  Chief Complaint   Patient presents with    Generalized Body Aches     pt reports gen body aches and headaches for past 3 days     21y  o female with no significant PMH presents to the ER for body aches, headache, sore throat and cough for 3 days  Patient denies taking any medication for symptoms  Symptoms are constant  She has positive sick contacts  She denies recent travel  She denies fever, chills, rhinorrhea/congestion, chest pain, dyspnea, N/V/D, abdominal pain, weakness or paresthesias  History provided by:  Patient   used: No        Prior to Admission Medications   Prescriptions Last Dose Informant Patient Reported? Taking?   naproxen (NAPROSYN) 500 mg tablet   No No   Sig: Take 1 tablet (500 mg total) by mouth 2 (two) times a day with meals      Facility-Administered Medications: None       No past medical history on file  No past surgical history on file  No family history on file  I have reviewed and agree with the history as documented  E-Cigarette/Vaping    E-Cigarette Use Never User      E-Cigarette/Vaping Substances    Nicotine No     THC No     CBD No     Flavoring No     Other No     Unknown No      Social History     Tobacco Use    Smoking status: Never Smoker    Smokeless tobacco: Never Used   Vaping Use    Vaping Use: Never used   Substance Use Topics    Alcohol use: No    Drug use: No       Review of Systems   Constitutional: Negative for activity change, appetite change, chills and fever  HENT: Positive for sore throat  Negative for congestion, drooling, ear discharge, ear pain, facial swelling and rhinorrhea  Eyes: Negative for redness  Respiratory: Positive for cough  Negative for shortness of breath  Cardiovascular: Negative for chest pain  Gastrointestinal: Negative for abdominal pain, diarrhea, nausea and vomiting  Musculoskeletal: Positive for myalgias  Negative for neck stiffness  Skin: Negative for rash  Allergic/Immunologic: Negative for food allergies  Neurological: Positive for headaches  Negative for weakness and numbness  Physical Exam  Physical Exam  Vitals and nursing note reviewed  Constitutional:       General: She is not in acute distress  Appearance: She is not toxic-appearing  HENT:      Head: Normocephalic and atraumatic  Mouth/Throat:      Lips: Pink  No lesions  Mouth: Mucous membranes are moist       Pharynx: Oropharynx is clear  Uvula midline  No pharyngeal swelling, oropharyngeal exudate, posterior oropharyngeal erythema or uvula swelling  Tonsils: No tonsillar exudate or tonsillar abscesses  Eyes:      Conjunctiva/sclera: Conjunctivae normal    Neck:      Trachea: No tracheal deviation  Cardiovascular:      Rate and Rhythm: Normal rate  Pulmonary:      Effort: Pulmonary effort is normal  No respiratory distress  Abdominal:      General: There is no distension  Musculoskeletal:      Cervical back: Normal range of motion and neck supple  Skin:     General: Skin is warm and dry  Findings: No rash  Neurological:      Mental Status: She is alert  GCS: GCS eye subscore is 4  GCS verbal subscore is 5  GCS motor subscore is 6     Psychiatric:         Mood and Affect: Mood normal          Vital Signs  ED Triage Vitals [01/05/22 1004]   Temperature Pulse Respirations Blood Pressure SpO2   98 9 °F (37 2 °C) 81 19 117/68 100 %      Temp Source Heart Rate Source Patient Position - Orthostatic VS BP Location FiO2 (%)   Oral Monitor Sitting Right arm --      Pain Score       No Pain           Vitals:    01/05/22 1004   BP: 117/68   Pulse: 81   Patient Position - Orthostatic VS: Sitting         Visual Acuity      ED Medications  Medications - No data to display    Diagnostic Studies  Results Reviewed     Procedure Component Value Units Date/Time    Strep A PCR [[id number]]  (Normal) Collected: 01/05/22 1129    Lab Status: Final result Specimen: Throat Updated: 01/05/22 1228     STREP A PCR None Detected    COVID/FLU - 24 hour TAT [[id number]] Collected: 01/05/22 1129    Lab Status: In process Specimen: Nasopharyngeal from Nose Updated: 01/05/22 1134                 No orders to display              Procedures  Procedures         ED Course                                             MDM  Number of Diagnoses or Management Options  Acute viral syndrome: new and requires workup  Person under investigation for COVID-19: new and requires workup  Diagnosis management comments: DDX consists of but not limited to: viral syndrome, covid, strep, flu    Will check a covid/flu test  Will check a strep test  Will discharge patient prior to test resulting  Will call patient with results  Patient agreeable  The management plan was discussed in detail with the patient at bedside and all questions were answered  Prior to discharge, we provided both verbal and written instructions  We discussed with the patient the signs and symptoms for which to return to the emergency department  All questions were answered and patient was comfortable with the plan of care and discharged to home  Instructed the patient to follow up with the primary care provider and/or specialist provided and their written instructions  The patient verbalized understanding of our discussion and plan of care, and agrees to return to the Emergency Department for concerns and progression of illness  At discharge, I instructed the patient to:  -follow up with pcp  -take Tylenol or Motrin for pain or fever  -rest and drink plenty of fluids  -return to the ER if symptoms worsened or new symptoms arose  Patient agreed to this plan and was stable at time of discharge         Amount and/or Complexity of Data Reviewed  Clinical lab tests: ordered and reviewed    Patient Progress  Patient progress: stable      Disposition  Final diagnoses:   Acute viral syndrome   Person under investigation for COVID-19     Time reflects when diagnosis was documented in both MDM as applicable and the Disposition within this note     Time User Action Codes Description Comment    1/5/2022 11:26 AM Eleanor Goodpasture A Add [B34 9] Acute viral syndrome     1/5/2022 11:26 AM Samina Slaughtersture A Add [Z20 822] Person under investigation for COVID-19       ED Disposition     ED Disposition Condition Date/Time Comment    Discharge Stable Wed Jan 5, 2022 11:26 AM Svetlana Grajeda discharge to home/self care  Follow-up Information     Follow up With Specialties Details Why Contact Info    Jannet Runner, MD Family Medicine Schedule an appointment as soon as possible for a visit  As needed [address]  [address] [facility] [address]  [phone]            Discharge Medication List as of 1/5/2022 11:27 AM      CONTINUE these medications which have NOT CHANGED    Details   naproxen (NAPROSYN) 500 mg tablet Take 1 tablet (500 mg total) by mouth 2 (two) times a day with meals, Starting Wed 5/27/2020, Normal             No discharge procedures on file      PDMP Review     None          ED Provider  Electronically Signed by           Rocael Rosas PA-C  01/05/22 5432

## 2022-01-08 LAB
FLUAV RNA RESP QL NAA+PROBE: NEGATIVE
FLUBV RNA RESP QL NAA+PROBE: NEGATIVE
SARS-COV-2 RNA RESP QL NAA+PROBE: POSITIVE

## 2023-10-27 NOTE — ED PROVIDER NOTES
History  Chief Complaint   Patient presents with    Finger Injury     right index finger injury while playing basketball yeterday, +swelling +difficulty bending     58-year-old female presents today complaining of pain to right index finger after she jammed it yesterday playing basketball  Admits to swelling and pain worse with flexion  She has not been taking any medications for her symptoms  She denies numbness, tingling, color change  None       History reviewed  No pertinent past medical history  History reviewed  No pertinent surgical history  History reviewed  No pertinent family history  I have reviewed and agree with the history as documented  Social History     Tobacco Use    Smoking status: Never Smoker    Smokeless tobacco: Never Used   Substance Use Topics    Alcohol use: No    Drug use: No        Review of Systems   Musculoskeletal: Positive for arthralgias and joint swelling  All other systems reviewed and are negative  Physical Exam  Physical Exam   Constitutional: She appears well-developed and well-nourished  No distress  Eyes: Conjunctivae are normal    Cardiovascular: Normal rate  Pulmonary/Chest: No respiratory distress  Musculoskeletal:        Right hand: She exhibits decreased range of motion, tenderness and swelling (Mild)  She exhibits normal capillary refill, no deformity and no laceration  Normal sensation noted  Hands:  Neurological: She is alert  No sensory deficit  Skin: Skin is warm and dry  Capillary refill takes less than 2 seconds  Psychiatric: She has a normal mood and affect   Her behavior is normal        Vital Signs  ED Triage Vitals [02/04/20 1101]   Temperature Pulse Respirations Blood Pressure SpO2   97 6 °F (36 4 °C) 72 14 131/84 99 %      Temp Source Heart Rate Source Patient Position - Orthostatic VS BP Location FiO2 (%)   Temporal Monitor -- Right arm --      Pain Score       9           Vitals:    02/04/20 1101   BP: 131/84   Pulse: 72         Visual Acuity      ED Medications  Medications - No data to display    Diagnostic Studies  Results Reviewed     None                 XR finger second digit-index RIGHT    (Results Pending)              Procedures  Procedures         ED Course                               MDM  Number of Diagnoses or Management Options  Finger sprain:   Diagnosis management comments: No acute bony injury noted on x-ray  Will apply finger splint in advised that patient follow-up with her PCP in the next few days  Also recommended that she take Motrin for pain and may apply ice for 20 minutes intervals q i d  Disposition  Final diagnoses:   Finger sprain     Time reflects when diagnosis was documented in both MDM as applicable and the Disposition within this note     Time User Action Codes Description Comment    2/4/2020 12:14 PM Laxmi Christian [[id number]] Finger sprain       ED Disposition     ED Disposition Condition Date/Time Comment    Discharge Stable Tue Feb 4, 2020 12:14 PM Fanny Conroy discharge to home/self care  Follow-up Information     Follow up With Specialties Details Why Contact Tom Wilde MD Family Medicine Schedule an appointment as soon as possible for a visit   [address]  [address] [facility]  [address]             Patient's Medications    No medications on file     No discharge procedures on file      ED Provider  Electronically Signed by           Drea Frost PA-C  02/04/20 7086 Additional Notes: Discussed different treatment options including Tremfya, continuing on Stelara, or becoming part of a study of Sotyktu. Patient opts for study. Number given for patient to call. Explained he will need to be flaring before entering study. Render Risk Assessment In Note?: no Detail Level: Simple